# Patient Record
(demographics unavailable — no encounter records)

---

## 2025-02-09 NOTE — ASSESSMENT
[FreeTextEntry1] : 55yo male with hx colon polyps, last colonoscopy in 2019, 1.5 single sessile adenomatous polyp with polypectomy.   #Colon Cancer Screening Given history of polyp will schedule colonoscopy. Risks (including but not limited to bleeding, infection, perforation and/or missed lesions) vs benefits discussed.  Instructions for preparation as well as procedure provided both verbally and in writing. Patient agrees to schedule procedure. SUPREP sent to pharmacy for preparation. All questions answered.